# Patient Record
Sex: MALE | Race: WHITE | NOT HISPANIC OR LATINO | Employment: OTHER | URBAN - METROPOLITAN AREA
[De-identification: names, ages, dates, MRNs, and addresses within clinical notes are randomized per-mention and may not be internally consistent; named-entity substitution may affect disease eponyms.]

---

## 2018-08-11 ENCOUNTER — HOSPITAL ENCOUNTER (EMERGENCY)
Facility: HOSPITAL | Age: 48
Discharge: HOME | End: 2018-08-11
Attending: EMERGENCY MEDICINE

## 2018-08-11 VITALS
RESPIRATION RATE: 17 BRPM | OXYGEN SATURATION: 98 % | TEMPERATURE: 97 F | SYSTOLIC BLOOD PRESSURE: 102 MMHG | HEART RATE: 62 BPM | WEIGHT: 180 LBS | DIASTOLIC BLOOD PRESSURE: 66 MMHG

## 2018-08-11 DIAGNOSIS — R41.82 ALTERED MENTAL STATUS: ICD-10-CM

## 2018-08-11 DIAGNOSIS — F19.10 POLYSUBSTANCE ABUSE: Primary | ICD-10-CM

## 2018-08-11 LAB
ALBUMIN SERPL BCP-MCNC: 3.8 G/DL
ALLENS TEST: ABNORMAL
ALP SERPL-CCNC: 56 U/L
ALT SERPL W/O P-5'-P-CCNC: 15 U/L
AMPHET+METHAMPHET UR QL: NEGATIVE
ANION GAP SERPL CALC-SCNC: 7 MMOL/L
APAP SERPL-MCNC: <3 UG/ML
AST SERPL-CCNC: 19 U/L
BARBITURATES UR QL SCN>200 NG/ML: NEGATIVE
BASOPHILS # BLD AUTO: 0.02 K/UL
BASOPHILS NFR BLD: 0.4 %
BENZODIAZ UR QL SCN>200 NG/ML: NORMAL
BILIRUB SERPL-MCNC: 0.9 MG/DL
BUN SERPL-MCNC: 6 MG/DL (ref 6–30)
BUN SERPL-MCNC: 7 MG/DL
BZE UR QL SCN: NEGATIVE
CALCIUM SERPL-MCNC: 8.6 MG/DL
CANNABINOIDS UR QL SCN: NEGATIVE
CHLORIDE SERPL-SCNC: 106 MMOL/L (ref 95–110)
CHLORIDE SERPL-SCNC: 109 MMOL/L
CO2 SERPL-SCNC: 27 MMOL/L
CREAT SERPL-MCNC: 1 MG/DL
CREAT SERPL-MCNC: 1.1 MG/DL (ref 0.5–1.4)
CREAT UR-MCNC: 29 MG/DL
DELSYS: ABNORMAL
DIFFERENTIAL METHOD: ABNORMAL
EOSINOPHIL # BLD AUTO: 0.1 K/UL
EOSINOPHIL NFR BLD: 1.1 %
ERYTHROCYTE [DISTWIDTH] IN BLOOD BY AUTOMATED COUNT: 13.3 %
ERYTHROCYTE [SEDIMENTATION RATE] IN BLOOD BY WESTERGREN METHOD: 21 MM/H
EST. GFR  (AFRICAN AMERICAN): >60 ML/MIN/1.73 M^2
EST. GFR  (NON AFRICAN AMERICAN): >60 ML/MIN/1.73 M^2
ETHANOL SERPL-MCNC: 204 MG/DL
FIO2: 21
GLUCOSE SERPL-MCNC: 84 MG/DL (ref 70–110)
GLUCOSE SERPL-MCNC: 87 MG/DL
HCO3 UR-SCNC: 32 MMOL/L (ref 24–28)
HCT VFR BLD AUTO: 43.7 %
HCT VFR BLD CALC: 44 %PCV (ref 36–54)
HGB BLD-MCNC: 14.6 G/DL
IMM GRANULOCYTES # BLD AUTO: 0.01 K/UL
IMM GRANULOCYTES NFR BLD AUTO: 0.2 %
LACTATE SERPL-SCNC: 1.6 MMOL/L
LIPASE SERPL-CCNC: 30 U/L
LYMPHOCYTES # BLD AUTO: 2.8 K/UL
LYMPHOCYTES NFR BLD: 51.3 %
MCH RBC QN AUTO: 31.1 PG
MCHC RBC AUTO-ENTMCNC: 33.4 G/DL
MCV RBC AUTO: 93 FL
METHADONE UR QL SCN>300 NG/ML: NEGATIVE
MODE: ABNORMAL
MONOCYTES # BLD AUTO: 0.5 K/UL
MONOCYTES NFR BLD: 8.5 %
NEUTROPHILS # BLD AUTO: 2.1 K/UL
NEUTROPHILS NFR BLD: 38.5 %
NRBC BLD-RTO: 0 /100 WBC
OPIATES UR QL SCN: NEGATIVE
PCO2 BLDA: 55.4 MMHG (ref 35–45)
PCP UR QL SCN>25 NG/ML: NEGATIVE
PH SMN: 7.37 [PH] (ref 7.35–7.45)
PLATELET # BLD AUTO: 248 K/UL
PMV BLD AUTO: 9.8 FL
PO2 BLDA: 20 MMHG (ref 40–60)
POC BE: 7 MMOL/L
POC IONIZED CALCIUM: 1.12 MMOL/L (ref 1.06–1.42)
POC SATURATED O2: 29 % (ref 95–100)
POC TCO2 (MEASURED): 26 MMOL/L (ref 23–29)
POC TCO2: 34 MMOL/L (ref 24–29)
POCT GLUCOSE: 84 MG/DL (ref 70–110)
POTASSIUM BLD-SCNC: 3.8 MMOL/L (ref 3.5–5.1)
POTASSIUM SERPL-SCNC: 3.9 MMOL/L
PROT SERPL-MCNC: 6.4 G/DL
RBC # BLD AUTO: 4.69 M/UL
SALICYLATES SERPL-MCNC: <5 MG/DL
SAMPLE: ABNORMAL
SAMPLE: NORMAL
SITE: ABNORMAL
SODIUM BLD-SCNC: 145 MMOL/L (ref 136–145)
SODIUM SERPL-SCNC: 143 MMOL/L
SP02: 100
TOXICOLOGY INFORMATION: NORMAL
WBC # BLD AUTO: 5.5 K/UL

## 2018-08-11 PROCEDURE — 83605 ASSAY OF LACTIC ACID: CPT

## 2018-08-11 PROCEDURE — 83690 ASSAY OF LIPASE: CPT

## 2018-08-11 PROCEDURE — 99284 EMERGENCY DEPT VISIT MOD MDM: CPT | Mod: ,,, | Performed by: EMERGENCY MEDICINE

## 2018-08-11 PROCEDURE — 93010 ELECTROCARDIOGRAM REPORT: CPT | Mod: ,,, | Performed by: INTERNAL MEDICINE

## 2018-08-11 PROCEDURE — 82803 BLOOD GASES ANY COMBINATION: CPT

## 2018-08-11 PROCEDURE — 82962 GLUCOSE BLOOD TEST: CPT | Mod: 59

## 2018-08-11 PROCEDURE — 80053 COMPREHEN METABOLIC PANEL: CPT

## 2018-08-11 PROCEDURE — 80329 ANALGESICS NON-OPIOID 1 OR 2: CPT

## 2018-08-11 PROCEDURE — 80307 DRUG TEST PRSMV CHEM ANLYZR: CPT

## 2018-08-11 PROCEDURE — 99900035 HC TECH TIME PER 15 MIN (STAT)

## 2018-08-11 PROCEDURE — 96361 HYDRATE IV INFUSION ADD-ON: CPT

## 2018-08-11 PROCEDURE — 85025 COMPLETE CBC W/AUTO DIFF WBC: CPT

## 2018-08-11 PROCEDURE — 80320 DRUG SCREEN QUANTALCOHOLS: CPT

## 2018-08-11 PROCEDURE — 99285 EMERGENCY DEPT VISIT HI MDM: CPT | Mod: 25

## 2018-08-11 PROCEDURE — 25000003 PHARM REV CODE 250: Performed by: EMERGENCY MEDICINE

## 2018-08-11 PROCEDURE — 96360 HYDRATION IV INFUSION INIT: CPT

## 2018-08-11 RX ADMIN — SODIUM CHLORIDE 1000 ML: 0.9 INJECTION, SOLUTION INTRAVENOUS at 06:08

## 2018-08-11 NOTE — ED PROVIDER NOTES
CC: Unresponsive (Patient found on sidewalk on [facility]. Patient found with a bottle of Xanax that was filled 3 days ago, originally had 30 tabs, has 4 tabs left. Patient smells of ETOH. Patient minimally responsive to pain. )      HPI: Herson English is a 47 y.o. year old male who presents to the ED complaining of AMS  Patient remebers taking xanax and drinking etoh around 10 pm last night, last thing that he remebers was driving, now asking repetedly if he hurt anybody  Per ems, he was found w/ altered mental status at the site of the road  patient goes through a divorce at this time, denies HI/SI  He is on alprazolam 0.5 mg, prescription for 30 tabs on 8/7, only 4 pills left, + 2 beers last night    Denies any other OTC/prescription meds/street drusgs    History reviewed. No pertinent past medical history.  History reviewed. No pertinent surgical history.  History reviewed. No pertinent family history.  No current facility-administered medications on file prior to encounter.      No current outpatient prescriptions on file prior to encounter.     Patient has no known allergies.  Social History     Social History    Marital status: Single     Spouse name: N/A    Number of children: N/A    Years of education: N/A     Social History Main Topics    Smoking status: None    Smokeless tobacco: None    Alcohol use Yes    Drug use: Unknown    Sexual activity: Not Asked     Other Topics Concern    None     Social History Narrative    None       ROS:     Constitutional : neg  HEENT neg  Resp neg  Cardiac  neg  GI neg   neg  Neuro pos ams  Heme/Immune: neg  Endo neg  Skin neg    PHYSICAL EXAM:  Vitals:    08/11/18 1121   BP: 102/66   Pulse: 62   Resp:    Temp:          PHYSICAL EXAM:   general: somnolent  VS: triage VS reviewed  HEENT: NC/AT, PERRL, EOMI, right eyelid mild erythema  Neck: trachea midline  CV: RRR, no m/r/g, no LE pitting edema  Resp: CTAB  ABD:  ND, + normal BS, NT  Renal: No CVAT  Neuro:  mildly slurred speech, AAO x 3, 5/5 muscle strength in upper and lower extremities, sensation grossly intact, face symmetric, speech normal  Ext: no deformity, +2 symmetrical peripheral pulses    Skin no ecchymosis/abrasions      DATA & INTERVENTIONS:    LABS reviewed:  Labs Reviewed   ALCOHOL,MEDICAL (ETHANOL) - Abnormal; Notable for the following:        Result Value    Alcohol, Medical, Serum 204 (*)     All other components within normal limits   COMPREHENSIVE METABOLIC PANEL - Abnormal; Notable for the following:     Calcium 8.6 (*)     Anion Gap 7 (*)     All other components within normal limits   CBC W/ AUTO DIFFERENTIAL - Abnormal; Notable for the following:     MCH 31.1 (*)     Lymph% 51.3 (*)     All other components within normal limits   ACETAMINOPHEN LEVEL - Abnormal; Notable for the following:     Acetaminophen (Tylenol), Serum <3.0 (*)     All other components within normal limits   SALICYLATE LEVEL - Abnormal; Notable for the following:     Salicylate Lvl <5.0 (*)     All other components within normal limits   ISTAT PROCEDURE - Abnormal; Notable for the following:     POC PCO2 55.4 (*)     POC PO2 20 (*)     POC HCO3 32.0 (*)     POC SATURATED O2 29 (*)     POC TCO2 34 (*)     All other components within normal limits   LIPASE   LACTIC ACID, PLASMA   DRUG SCREEN PANEL, URINE EMERGENCY   POCT GLUCOSE   ISTAT PROCEDURE   POCT GLUCOSE MONITORING CONTINUOUS   ISTAT CHEM8       RADIOLOGY reviewed:  Imaging Results          CT Head Without Contrast (Final result)  Result time 08/11/18 07:23:56    Final result by Fanny Duenas MD (08/11/18 07:23:56)                 Impression:      No CT evidence of acute intracranial abnormality. Clinical correlation and further evaluation as warranted.      Electronically signed by: Fanny Duenas MD  Date:    08/11/2018  Time:    07:23             Narrative:    EXAMINATION:  CT HEAD WITHOUT CONTRAST    CLINICAL HISTORY:  altered mental status, amnestic for event last  night;    TECHNIQUE:  Low dose axial images were obtained through the head.  Coronal and sagittal reformations were also performed. Contrast was not administered.    COMPARISON:  None.    FINDINGS:  There is no acute intracranial hemorrhage, hydrocephalus, midline shift or mass effect. Gray-white matter differentiation appears maintained. The basal cisterns are patent. The mastoid air cells and paranasal sinuses are clear of acute process. The visualized bones of the calvarium demonstrate no acute osseous abnormality.                                MEDICATIONS/FLUIDS:  Medications   sodium chloride 0.9% bolus 1,000 mL (0 mLs Intravenous Stopped 8/11/18 0745)         MDM:   48 yo M w/ AMS after xanax and etoh use last night. Now w/ mildly slurred speech, no focal weakness, denies any pain  ekg no acute  PH wnl  Istat no gross abn, POC glu 84  Cbc no gross abn  cmp wnl  etoh 204  Lipase wnl  Lactate wnl  Acetaminophen neg  Salicylate neg  CT head no acute    IVF  10:30 AM  Somnolent but easily arousable      patient monitored in the ED until sober, AAOx3, denies taking more than 2 tabs xanax yesterday, + ETOH, no si/hi. patient was able to ambulate w/out difficulty. Discharged home with friend    12:14PM  Patient ambulating normally.      IMPRESSION:  1.) benzodiazepines and alcohol use      Dispo: Discharged in stable condition    Critical Care Time: N/A       Shantal Dawn MD  08/11/18 2057

## 2018-08-11 NOTE — ED NOTES
Patient assisted to get dressed. Pt ambulated around ER without difficulty, escorted by ED RN. Pt given cup of water for PO challenge, Dr Dawn notified

## 2018-08-11 NOTE — ED PROVIDER NOTES
Encounter Date: 8/11/2018       History     Chief Complaint   Patient presents with    Unresponsive     Patient found on sidewalk on [facility]. Patient found with a bottle of Xanax that was filled 3 days ago, originally had 30 tabs, has 4 tabs left. Patient smells of ETOH. Patient minimally responsive to pain.      HPI  Review of patient's allergies indicates:  Not on File  No past medical history on file.  No past surgical history on file.  No family history on file.  Social History   Substance Use Topics    Smoking status: Not on file    Smokeless tobacco: Not on file    Alcohol use Not on file     Review of Systems    Physical Exam     Initial Vitals [08/11/18 0523]   BP Pulse Resp Temp SpO2   99/64 64 12 96.8 °F (36 °C) 97 %      MAP       --         Physical Exam    ED Course   Procedures  Labs Reviewed - No data to display       Imaging Results    None                               Clinical Impression:   {Add your Clinical Impression here. If you haven't documented one yet, please pend the note, finalize a Clinical Impression, and refresh your note before signing.:[id number]}

## 2018-08-11 NOTE — ED NOTES
Patient found on sidewalk on [facility]. Patient found with a bottle of Xanax that was filled 3 days ago, originally had 30 tabs, has 4 tabs left. Patient smells of ETOH. Patient responding to questions. Confused. States that he took 4 xanax and drank 2 beers. Patient oriented to self. Patient denies SI/HI at this time.

## 2021-12-06 ENCOUNTER — PATIENT MESSAGE (OUTPATIENT)
Dept: RESEARCH | Facility: HOSPITAL | Age: 51
End: 2021-12-06